# Patient Record
Sex: MALE | Race: OTHER | NOT HISPANIC OR LATINO | Employment: FULL TIME | URBAN - METROPOLITAN AREA
[De-identification: names, ages, dates, MRNs, and addresses within clinical notes are randomized per-mention and may not be internally consistent; named-entity substitution may affect disease eponyms.]

---

## 2023-07-06 ENCOUNTER — OFFICE VISIT (OUTPATIENT)
Dept: MEDICAL GROUP | Facility: MEDICAL CENTER | Age: 28
End: 2023-07-06
Payer: COMMERCIAL

## 2023-07-06 VITALS
SYSTOLIC BLOOD PRESSURE: 108 MMHG | TEMPERATURE: 98.5 F | BODY MASS INDEX: 25.27 KG/M2 | WEIGHT: 170.6 LBS | DIASTOLIC BLOOD PRESSURE: 70 MMHG | OXYGEN SATURATION: 95 % | HEART RATE: 68 BPM | HEIGHT: 69 IN

## 2023-07-06 DIAGNOSIS — K62.5 BRBPR (BRIGHT RED BLOOD PER RECTUM): ICD-10-CM

## 2023-07-06 DIAGNOSIS — R14.0 BLOATED ABDOMEN: ICD-10-CM

## 2023-07-06 PROCEDURE — 3078F DIAST BP <80 MM HG: CPT | Performed by: STUDENT IN AN ORGANIZED HEALTH CARE EDUCATION/TRAINING PROGRAM

## 2023-07-06 PROCEDURE — 99203 OFFICE O/P NEW LOW 30 MIN: CPT | Performed by: STUDENT IN AN ORGANIZED HEALTH CARE EDUCATION/TRAINING PROGRAM

## 2023-07-06 PROCEDURE — 3074F SYST BP LT 130 MM HG: CPT | Performed by: STUDENT IN AN ORGANIZED HEALTH CARE EDUCATION/TRAINING PROGRAM

## 2023-07-06 RX ORDER — OMEPRAZOLE 20 MG/1
20 CAPSULE, DELAYED RELEASE ORAL DAILY
COMMUNITY

## 2023-07-06 ASSESSMENT — ENCOUNTER SYMPTOMS
DIARRHEA: 0
FEVER: 0
VOMITING: 0
HEARTBURN: 0
DIZZINESS: 0
BLOOD IN STOOL: 1
DIAPHORESIS: 0
CONSTIPATION: 0
CHILLS: 0
PALPITATIONS: 0
NAUSEA: 0
WHEEZING: 0
SHORTNESS OF BREATH: 0
WEIGHT LOSS: 0
HEADACHES: 0

## 2023-07-06 ASSESSMENT — PATIENT HEALTH QUESTIONNAIRE - PHQ9: CLINICAL INTERPRETATION OF PHQ2 SCORE: 0

## 2023-07-06 NOTE — PROGRESS NOTES
"Subjective:     CC:  Diagnoses of BRBPR (bright red blood per rectum) and Bloated abdomen were pertinent to this visit.    HISTORY OF THE PRESENT ILLNESS: Patient is a 28 y.o. male. This pleasant patient is here today to establish care and discuss     Pleasant patient, with history of bowel sensitivity, history of EGD within past 5 years told normal. Who present for recent BRBPR and subsequent bloatedness.     Per history, patient report he feels he has been passing harder stools though having regular daily bowel movement. Recently noted blood in stool and has increase fiber in his diet. Since adjusting his diet he noted relief of BRBPR but has been experiencing bloatedness in past 3 weeks.     No red flags per ROS    Problem   Brbpr (Bright Red Blood Per Rectum)    This is chronic condition, recent episodes  Med/Social- Denies NSAID, ASA, AC, etoh       Bloated Abdomen       Health Maintenance:     ROS:   Review of Systems   Constitutional:  Negative for chills, diaphoresis, fever, malaise/fatigue and weight loss.   HENT:  Negative for hearing loss.    Respiratory:  Negative for shortness of breath and wheezing.    Cardiovascular:  Negative for chest pain and palpitations.   Gastrointestinal:  Positive for blood in stool. Negative for constipation, diarrhea, heartburn, melena, nausea and vomiting.   Genitourinary:  Negative for frequency and urgency.   Skin:  Negative for rash.   Neurological:  Negative for dizziness and headaches.         Objective:       Exam: /70 (BP Location: Right arm, Patient Position: Sitting, BP Cuff Size: Adult)   Pulse 68   Temp 36.9 °C (98.5 °F) (Temporal)   Ht 1.753 m (5' 9\")   Wt 77.4 kg (170 lb 9.6 oz)   SpO2 95%  Body mass index is 25.19 kg/m².    Physical Exam  Constitutional:       Appearance: Normal appearance.   Cardiovascular:      Rate and Rhythm: Normal rate and regular rhythm.   Pulmonary:      Effort: Pulmonary effort is normal.      Breath sounds: Normal breath " sounds.   Abdominal:      General: There is no distension.      Palpations: Abdomen is soft.      Tenderness: There is no abdominal tenderness.   Neurological:      Mental Status: He is alert.       Labs:     Assessment & Plan:   28 y.o. male with the following -      1. BRBPR (bright red blood per rectum)  History of  Acute, stable  BRBPR, history consistent with hemorrhoid  Recommend increasing fluid intake/ stool softener/ soluble fiber    2. Bloated abdomen  Acute, stable x 3 week  Etiology suspect related to excess fiber  Recommend simplifying diet BRAT/ FODMAP diet  If symptoms improve consider slowly adding soluble fiber to prevent hemorrhoid/ straining  Plan  OTC simethicone, pepto-bismol, continue omeprazole prn  Obtain lab CBC, CMP, h pylori if symptoms persist          Return in about 4 weeks (around 8/3/2023) for Lab review.    Please note that this dictation was created using voice recognition software. I have made every reasonable attempt to correct obvious errors, but I expect that there are errors of grammar and possibly content that I did not discover before finalizing the note.

## 2024-02-19 ENCOUNTER — APPOINTMENT (OUTPATIENT)
Dept: URGENT CARE | Facility: CLINIC | Age: 29
End: 2024-02-19
Payer: COMMERCIAL